# Patient Record
Sex: FEMALE | URBAN - METROPOLITAN AREA
[De-identification: names, ages, dates, MRNs, and addresses within clinical notes are randomized per-mention and may not be internally consistent; named-entity substitution may affect disease eponyms.]

---

## 2019-07-16 ENCOUNTER — APPOINTMENT (RX ONLY)
Dept: URBAN - METROPOLITAN AREA CLINIC 124 | Facility: CLINIC | Age: 54
Setting detail: DERMATOLOGY
End: 2019-07-16

## 2019-07-16 DIAGNOSIS — L82.1 OTHER SEBORRHEIC KERATOSIS: ICD-10-CM

## 2019-07-16 DIAGNOSIS — L91.8 OTHER HYPERTROPHIC DISORDERS OF THE SKIN: ICD-10-CM

## 2019-07-16 PROCEDURE — ? SKIN TAG REMOVAL

## 2019-07-16 PROCEDURE — ? COUNSELING

## 2019-07-16 PROCEDURE — 11200 RMVL SKIN TAGS UP TO&INC 15: CPT

## 2019-07-16 PROCEDURE — 99202 OFFICE O/P NEW SF 15 MIN: CPT | Mod: 25

## 2019-07-16 ASSESSMENT — LOCATION DETAILED DESCRIPTION DERM
LOCATION DETAILED: LEFT RIB CAGE
LOCATION DETAILED: RIGHT ANTERIOR PROXIMAL THIGH

## 2019-07-16 ASSESSMENT — LOCATION ZONE DERM
LOCATION ZONE: TRUNK
LOCATION ZONE: LEG

## 2019-07-16 ASSESSMENT — LOCATION SIMPLE DESCRIPTION DERM
LOCATION SIMPLE: ABDOMEN
LOCATION SIMPLE: RIGHT THIGH

## 2019-07-16 NOTE — HPI: SKIN LESION (SKIN TAGS)
How Severe Are They?: mild
[Eats regular meals including adequate fruits and vegetables] : eats regular meals including adequate fruits and vegetables
Is This A New Presentation, Or A Follow-Up?: Skin Lesion
[Drinks non-sweetened liquids] : drinks non-sweetened liquids
[Calcium source] : has a source for calcium
[Screen time (except for homework) less than 2 hours/day] : screen time (except for homework) less than 2 hours/day
[Has interests/participates in community activities/volunteers] : has interests/participates in community activities/volunteers
[Home is free of violence] : home is free of violence
[Uses safety belts/safety equipment] : uses safety belts/safety equipment
[Has peer relationships free of violence] : has peer relationships free of violence
[Always] : always
[Has ways to cope with stress] : has ways to cope with stress
[Displays self-confidence] : displays self-confidence
[Has problems with sleep] : has problems with sleep
[FreeTextEntry1] : will try melatonin for difficulty falling asleep
[[id number]] : 0
[[id number]] : 1
[Has concerns about body or appearance] : has no concerns about body or appearance
[Impaired/Distracted driving] : no impaired/distracted driving
[Sexually Active] : The patient is not sexually active
[Gets depressed, anxious, or irritable / has mood swings] : does not get depressed, anxious, or irritable / has no mood swings
[Has thoughts about hurting self or considered suicide] : has no thoughts about hurting self or considered suicide

## 2019-07-16 NOTE — PROCEDURE: MIPS QUALITY
Quality 131: Pain Assessment And Follow-Up: Pain assessment documented as positive using a standardized tool AND a follow-up plan is documented
Quality 130: Documentation Of Current Medications In The Medical Record: Current Medications Documented
Additional Notes: Pain: 6/10
Detail Level: Simple
Quality 474: Zoster Vaccination Status: Shingrix Vaccination not Administered or Previously Received, Reason not Otherwise Specified
